# Patient Record
Sex: MALE | Race: AMERICAN INDIAN OR ALASKA NATIVE
[De-identification: names, ages, dates, MRNs, and addresses within clinical notes are randomized per-mention and may not be internally consistent; named-entity substitution may affect disease eponyms.]

---

## 2019-10-18 ENCOUNTER — HOSPITAL ENCOUNTER (EMERGENCY)
Dept: HOSPITAL 5 - ED | Age: 29
Discharge: HOME | End: 2019-10-18
Payer: SELF-PAY

## 2019-10-18 VITALS — SYSTOLIC BLOOD PRESSURE: 140 MMHG | DIASTOLIC BLOOD PRESSURE: 73 MMHG

## 2019-10-18 DIAGNOSIS — F12.10: ICD-10-CM

## 2019-10-18 DIAGNOSIS — J45.909: Primary | ICD-10-CM

## 2019-10-18 LAB
BAND NEUTROPHILS # (MANUAL): 0 K/MM3
BUN SERPL-MCNC: 19 MG/DL (ref 9–20)
BUN/CREAT SERPL: 15 %
CALCIUM SERPL-MCNC: 9.5 MG/DL (ref 8.4–10.2)
HCT VFR BLD CALC: 48.2 % (ref 35.5–45.6)
HEMOLYSIS INDEX: 30
HGB BLD-MCNC: 16.5 GM/DL (ref 11.8–15.2)
MCHC RBC AUTO-ENTMCNC: 34 % (ref 32–34)
MCV RBC AUTO: 89 FL (ref 84–94)
MYELOCYTES # (MANUAL): 0 K/MM3
PLATELET # BLD: 201 K/MM3 (ref 140–440)
PROMYELOCYTES # (MANUAL): 0 K/MM3
RBC # BLD AUTO: 5.41 M/MM3 (ref 3.65–5.03)
TOTAL CELLS COUNTED BLD: 100

## 2019-10-18 PROCEDURE — 99284 EMERGENCY DEPT VISIT MOD MDM: CPT

## 2019-10-18 PROCEDURE — 96372 THER/PROPH/DIAG INJ SC/IM: CPT

## 2019-10-18 PROCEDURE — 80048 BASIC METABOLIC PNL TOTAL CA: CPT

## 2019-10-18 PROCEDURE — 85007 BL SMEAR W/DIFF WBC COUNT: CPT

## 2019-10-18 PROCEDURE — 36415 COLL VENOUS BLD VENIPUNCTURE: CPT

## 2019-10-18 PROCEDURE — 85025 COMPLETE CBC W/AUTO DIFF WBC: CPT

## 2019-10-18 PROCEDURE — 71046 X-RAY EXAM CHEST 2 VIEWS: CPT

## 2019-10-18 NOTE — XRAY REPORT
CHEST PA AND LATERAL VIEWS



INDICATION: 

shortness of breath, chest pain.



COMPARISON: 

None.



FINDINGS:



Support devices: Unchanged.



Heart: Stable. 



Lungs/Pleura: No acute pulmonary or pleural findings.  







IMPRESSION:

1. No significant change.



Signer Name: Chang Murry MD 

Signed: 10/18/2019 5:05 PM

 Workstation Name: [id number]

## 2019-10-18 NOTE — EVENT NOTE
ED Screening Note


Date of service: 10/18/19


Time: 15:42


ED Screening Note: 


Pt with known hx of asthma complains of asthma attack x 2 hours PTA. States 

inhaler not working. Hx of intubation for asthma 








This initial assessment/diagnostic orders/clinical plan/treatment(s) is/are 

subject to change based on patients health status, clinical progression and re-

assessment by fellow clinical providers in the ED. Further treatment and workup 

at subsequent clinical providers discretion. Patient/guardian urged not to elope

from the ED as their condition may be serious if not clinically assessed and 

managed. 





Initial orders include: 


Labs


CXR


Solumedrol


continuous Duoneb

## 2019-10-18 NOTE — EMERGENCY DEPARTMENT REPORT
ED Asthma HPI





- General


Chief Complaint: Dyspnea/Respdistress


Stated Complaint: ASTHMA ATTACK


Time Seen by Provider: 10/18/19 17:04


Source: patient


Mode of arrival: Ambulatory


Limitations: No Limitations





- History of Present Illness


Initial Comments: 





29-year-old -American male presents to the emergency room complaining of 

difficulty breathing with wheezing.  Patient reports that he has a past medical 

history of asthma and has a history of intubation.  Patient reports he was 

cooking when he got overcome by the smoke.  Patient reports he was using his 

inhaler but not getting much relief.  Patient denies any fever or chills.


MD Complaint: "asthma attack"


Onset/Timin


-: hour(s) (PTA)


Asthma History: history of frequent attac, history of prior ED visit, previously

intubated


Severity: severe


Context: smoke exposure (during cooking)


Treatments Prior to Arrival: inhaled bronchodilator





- Related Data


Current Asthma Therapy: inhaled bronchodilator


                                  Previous Rx's











 Medication  Instructions  Recorded  Last Taken  Type


 


Albuterol Sulfate [Proventil Hfa] 6.7 gm IH QID PRN #1 hfa.aer.ad 10/18/19 Unkn

own Rx


 


predniSONE [Deltasone] 20 mg PO QDAY 5 Days #5 tab 10/18/19 Unknown Rx











                                    Allergies











Allergy/AdvReac Type Severity Reaction Status Date / Time


 


ketamine [From Ketalar] Allergy  Unknown Verified 10/18/19 15:41














ED Review of Systems


ROS: 


Stated complaint: ASTHMA ATTACK


Other details as noted in HPI





Comment: All other systems reviewed and negative


Respiratory: cough, shortness of breath, wheezing





ED Past Medical Hx





- Social History


Smoking Status: Never Smoker


Substance Use Type: Alcohol, Marijuana





- Medications


Home Medications: 


                                Home Medications











 Medication  Instructions  Recorded  Confirmed  Last Taken  Type


 


Albuterol Sulfate [Proventil Hfa] 6.7 gm IH QID PRN #1 hfa.aer.ad 10/18/19  

Unknown Rx


 


predniSONE [Deltasone] 20 mg PO QDAY 5 Days #5 tab 10/18/19  Unknown Rx














ED Physical Exam





- General


Limitations: No Limitations


General appearance: alert, in no apparent distress





- Head


Head exam: Present: atraumatic, normocephalic





- Eye


Eye exam: Present: normal appearance





- ENT


ENT exam: Present: mucous membranes moist





- Neck


Neck exam: Present: normal inspection





- Respiratory


Respiratory exam: Present: normal lung sounds bilaterally.  Absent: respiratory 

distress, wheezes





- Cardiovascular


Cardiovascular Exam: Present: regular rate, normal rhythm.  Absent: systolic 

murmur, diastolic murmur, rubs, gallop





- GI/Abdominal


GI/Abdominal exam: Present: soft, normal bowel sounds





- Neurological Exam


Neurological exam: Present: alert, oriented X3, normal gait





- Psychiatric


Psychiatric exam: Present: normal affect, normal mood





- Skin


Skin exam: Present: warm, dry, intact, normal color.  Absent: rash





ED Course


                                   Vital Signs











  10/18/19





  15:38


 


Temperature 97.5 F L


 


Pulse Rate 111 H


 


Respiratory 18





Rate 


 


Blood Pressure 134/94


 


O2 Sat by Pulse 94





Oximetry 














ED Medical Decision Making





- Lab Data


Result diagrams: 


                                 10/18/19 16:02





                                 10/18/19 16:02





- Radiology Data


Radiology results: report reviewed





Patient: NICOLA MONTEIRO MR#: 


[id number]


: 1990 Acct:[id number]





Age/Sex: 29 / M ADM Date: 10/18/19





Loc: ED


Attending Dr:








Ordering Physician: MARY JANE BETHEA


Date of Service: 10/18/19


Procedure(s): XR chest routine 2V


Accession Number(s): [id number]





cc: MARY JANE BETHEA





Fluoro Time In Minutes:





CHEST PA AND LATERAL VIEWS





INDICATION:


shortness of breath, chest pain.





COMPARISON:


None.





FINDINGS:





Support devices: Unchanged.





Heart: Stable.





Lungs/Pleura: No acute pulmonary or pleural findings.











IMPRESSION:


1. No significant change.





Signer Name: Chang Murry MD


Signed: 10/18/2019 5:05 PM


Workstation Name: [id number]








Transcribed By: SW


Dictated By: Chang Murry MD


Electronically Authenticated By: Chang Murry MD


Signed Date/Time: 10/18/19 1705











DD/DT: 10/18/19 1705


TD/TT:





- Medical Decision Making





29-year-old -American male presents to the emergency room complaining of 

difficulty breathing with wheezing.  Patient reports that he has a past medical 

history of asthma and has a history of intubation.  Patient reports he was 

cooking when he got overcome by the smoke.  Patient reports he was using his 

inhaler but not getting much relief.  Patient denies any fever or chills.











Patient comes in with reported wheezing.  Patient has started his asthma 

treatment prior to this provider evaluating patient.  At this time patient 

respiratory examination is clear to auscultation patient is not using any 

accessory muscles no wheezing appreciated.








Chest x-rays pending.


Critical care attestation.: 


If time is entered above; I have spent that time in minutes in the direct care 

of this critically ill patient, excluding procedure time.








ED Disposition


Clinical Impression: 


 Asthma attack





Disposition: DC- TO HOME OR SELFCARE


Is pt being admited?: No


Does the pt Need Aspirin: No


Condition: Stable


Instructions:  Asthma (ED)


Prescriptions: 


predniSONE [Deltasone] 20 mg PO QDAY 5 Days #5 tab


Albuterol Sulfate [Proventil Hfa] 6.7 gm IH QID PRN #1 hfa.aer.ad


 PRN Reason: Wheezing


Referrals: 


ERIKA PIPER MD [Staff Physician] - 3-5 Days


Forms:  Work/School Release Form(ED)